# Patient Record
Sex: FEMALE
[De-identification: names, ages, dates, MRNs, and addresses within clinical notes are randomized per-mention and may not be internally consistent; named-entity substitution may affect disease eponyms.]

---

## 2019-03-04 ENCOUNTER — APPOINTMENT (OUTPATIENT)
Dept: PULMONOLOGY | Facility: CLINIC | Age: 39
End: 2019-03-04
Payer: MEDICAID

## 2019-03-04 VITALS
HEIGHT: 63.27 IN | DIASTOLIC BLOOD PRESSURE: 94 MMHG | OXYGEN SATURATION: 100 % | HEART RATE: 91 BPM | SYSTOLIC BLOOD PRESSURE: 141 MMHG | WEIGHT: 161 LBS | BODY MASS INDEX: 28.17 KG/M2

## 2019-03-04 DIAGNOSIS — Z87.891 PERSONAL HISTORY OF NICOTINE DEPENDENCE: ICD-10-CM

## 2019-03-04 DIAGNOSIS — Z82.49 FAMILY HISTORY OF ISCHEMIC HEART DISEASE AND OTHER DISEASES OF THE CIRCULATORY SYSTEM: ICD-10-CM

## 2019-03-04 DIAGNOSIS — J18.9 PNEUMONIA, UNSPECIFIED ORGANISM: ICD-10-CM

## 2019-03-04 DIAGNOSIS — Z87.2 PERSONAL HISTORY OF DISEASES OF THE SKIN AND SUBCUTANEOUS TISSUE: ICD-10-CM

## 2019-03-04 DIAGNOSIS — R91.8 OTHER NONSPECIFIC ABNORMAL FINDING OF LUNG FIELD: ICD-10-CM

## 2019-03-04 DIAGNOSIS — Z80.3 FAMILY HISTORY OF MALIGNANT NEOPLASM OF BREAST: ICD-10-CM

## 2019-03-04 PROCEDURE — 94060 EVALUATION OF WHEEZING: CPT | Mod: 26

## 2019-03-04 PROCEDURE — 94727 GAS DIL/WSHOT DETER LNG VOL: CPT | Mod: 26

## 2019-03-04 PROCEDURE — 94729 DIFFUSING CAPACITY: CPT | Mod: 26

## 2019-03-04 PROCEDURE — 99203 OFFICE O/P NEW LOW 30 MIN: CPT | Mod: 25

## 2019-03-04 NOTE — DISCUSSION/SUMMARY
[FreeTextEntry1] : The patient has clinically improved since x-ray was done. We'll repeat a chest x-ray in the next week.\par The patient has high blood pressure. She says that she's been drinking a lot of coffee lately. She'll follow up the blood pressure with the PMD.\par She was advised to reduce her calorie intake and exercise regularly.

## 2019-03-04 NOTE — REVIEW OF SYSTEMS
[Recent Wt Gain (___ Lbs)] : recent [unfilled] ~Ulb weight gain [Cough] : no cough [Dyspnea] : no dyspnea [Rash] : [unfilled] rash [Negative] : Sleep Disorder

## 2019-03-04 NOTE — PROCEDURE
[FreeTextEntry1] : The pulmonary function testing done reveals normal spirometry, normal DLCO and normal lung volumes.

## 2022-11-07 ENCOUNTER — NON-APPOINTMENT (OUTPATIENT)
Age: 42
End: 2022-11-07